# Patient Record
Sex: FEMALE | Race: ASIAN | Employment: FULL TIME | URBAN - METROPOLITAN AREA
[De-identification: names, ages, dates, MRNs, and addresses within clinical notes are randomized per-mention and may not be internally consistent; named-entity substitution may affect disease eponyms.]

---

## 2017-03-08 ENCOUNTER — OFFICE VISIT (OUTPATIENT)
Dept: INTERNAL MEDICINE | Facility: CLINIC | Age: 22
End: 2017-03-08
Payer: COMMERCIAL

## 2017-03-08 VITALS
OXYGEN SATURATION: 99 % | HEIGHT: 61 IN | WEIGHT: 128 LBS | TEMPERATURE: 98.6 F | DIASTOLIC BLOOD PRESSURE: 70 MMHG | HEART RATE: 76 BPM | SYSTOLIC BLOOD PRESSURE: 110 MMHG | BODY MASS INDEX: 24.17 KG/M2

## 2017-03-08 DIAGNOSIS — E55.9 VITAMIN D DEFICIENCY: ICD-10-CM

## 2017-03-08 DIAGNOSIS — Z11.3 SCREEN FOR STD (SEXUALLY TRANSMITTED DISEASE): Primary | ICD-10-CM

## 2017-03-08 DIAGNOSIS — Z13.6 CARDIOVASCULAR SCREENING; LDL GOAL LESS THAN 160: ICD-10-CM

## 2017-03-08 LAB
ALBUMIN SERPL-MCNC: 3.9 G/DL (ref 3.4–5)
ALP SERPL-CCNC: 69 U/L (ref 40–150)
ALT SERPL W P-5'-P-CCNC: 19 U/L (ref 0–50)
ANION GAP SERPL CALCULATED.3IONS-SCNC: 7 MMOL/L (ref 3–14)
AST SERPL W P-5'-P-CCNC: 14 U/L (ref 0–45)
BILIRUB SERPL-MCNC: 0.3 MG/DL (ref 0.2–1.3)
BUN SERPL-MCNC: 14 MG/DL (ref 7–30)
CALCIUM SERPL-MCNC: 8.8 MG/DL (ref 8.5–10.1)
CHLORIDE SERPL-SCNC: 106 MMOL/L (ref 94–109)
CHOLEST SERPL-MCNC: 134 MG/DL
CO2 SERPL-SCNC: 28 MMOL/L (ref 20–32)
CREAT SERPL-MCNC: 0.77 MG/DL (ref 0.52–1.04)
DEPRECATED CALCIDIOL+CALCIFEROL SERPL-MC: 8 UG/L (ref 20–75)
GFR SERPL CREATININE-BSD FRML MDRD: NORMAL ML/MIN/1.7M2
GLUCOSE SERPL-MCNC: 85 MG/DL (ref 70–99)
HDLC SERPL-MCNC: 55 MG/DL
HGB BLD-MCNC: 12.1 G/DL (ref 11.7–15.7)
LDLC SERPL CALC-MCNC: 71 MG/DL
NONHDLC SERPL-MCNC: 79 MG/DL
POTASSIUM SERPL-SCNC: 4.1 MMOL/L (ref 3.4–5.3)
PROT SERPL-MCNC: 7.3 G/DL (ref 6.8–8.8)
SODIUM SERPL-SCNC: 141 MMOL/L (ref 133–144)
TRIGL SERPL-MCNC: 42 MG/DL

## 2017-03-08 PROCEDURE — 82306 VITAMIN D 25 HYDROXY: CPT | Performed by: INTERNAL MEDICINE

## 2017-03-08 PROCEDURE — 80053 COMPREHEN METABOLIC PANEL: CPT | Performed by: INTERNAL MEDICINE

## 2017-03-08 PROCEDURE — 80061 LIPID PANEL: CPT | Performed by: INTERNAL MEDICINE

## 2017-03-08 PROCEDURE — 36415 COLL VENOUS BLD VENIPUNCTURE: CPT | Performed by: INTERNAL MEDICINE

## 2017-03-08 PROCEDURE — 87491 CHLMYD TRACH DNA AMP PROBE: CPT | Performed by: INTERNAL MEDICINE

## 2017-03-08 PROCEDURE — 99214 OFFICE O/P EST MOD 30 MIN: CPT | Performed by: INTERNAL MEDICINE

## 2017-03-08 PROCEDURE — 85018 HEMOGLOBIN: CPT | Performed by: INTERNAL MEDICINE

## 2017-03-08 NOTE — PROGRESS NOTES
SUBJECTIVE:                                                    Aracelis Tyson is a 22 year old female who presents to clinic today for the following health issues:    New patient to me and .  Patient previously seen in pediatrics, looking to transfer care to . No specific concerns today.     Pap- DUE, advised, prefers female provider    LMP early February. No sexual activity.    Problem list and histories reviewed & adjusted, as indicated.  Additional history: as documented    Patient Active Problem List   Diagnosis     Dental caries     Vitamin D deficiency     Anemia     History reviewed. No pertinent past surgical history.    Social History   Substance Use Topics     Smoking status: Never Smoker     Smokeless tobacco: Never Used     Alcohol use No     Family History   Problem Relation Age of Onset     Connective Tissue Disorder Brother      vocal cord paralysis         No current outpatient prescriptions on file.     BP Readings from Last 3 Encounters:   03/08/17 110/70   09/25/16 117/78   03/23/16 96/66    Wt Readings from Last 3 Encounters:   03/08/17 128 lb (58.1 kg)   09/25/16 130 lb (59 kg)   03/23/16 133 lb (60.3 kg)           Labs reviewed in EPIC    Reviewed and updated as needed this visit by clinical staff       Reviewed and updated as needed this visit by Provider       ROS:  C: NEGATIVE for fever, chills, change in weight  E/M: NEGATIVE for ear, mouth and throat problems  R: NEGATIVE for significant cough or SOB  CV: NEGATIVE for chest pain, palpitations or peripheral edema  GI: NEGATIVE for nausea, abdominal pain, heartburn, or change in bowel habits  : NEGATIVE for frequency, dysuria, or hematuria  M: NEGATIVE for significant arthralgias or myalgia  H: NEGATIVE for bleeding problems  P: NEGATIVE for changes in mood or affect    OBJECTIVE:                                                    /70 (BP Location: Left arm, Patient Position: Chair, Cuff Size: Adult Regular)  Pulse 76  Temp  "98.6  F (37  C) (Oral)  Ht 5' 0.5\" (1.537 m)  Wt 128 lb (58.1 kg)  LMP 02/10/2017 (Approximate)  SpO2 99%  Breastfeeding? No  BMI 24.59 kg/m2  Body mass index is 24.59 kg/(m^2).  GENERAL: healthy, alert and no distress  EYES: Eyes grossly normal to inspection, extraocular movements - intact, and PERRL  HENT: ear canals- normal; TMs- normal; Nose- normal; Mouth- no ulcers, no lesions  NECK: no tenderness, no adenopathy, no asymmetry, no masses, no stiffness; thyroid- normal to palpation  RESP: lungs clear to auscultation - no rales, no rhonchi, no wheezes  CV: regular rates and rhythm, normal S1 S2, no S3 or S4 and no murmur, no click or rub   MS: extremities- no gross deformities noted  PSYCH: Alert and oriented times 3; speech- coherent , normal rate and volume; able to articulate logical thoughts, able to abstract reason, no tangential thoughts, no hallucinations or delusions, affect- normal     ASSESSMENT/PLAN:                                                      (Z11.3) Screen for STD (sexually transmitted disease)  (primary encounter diagnosis)  Comment: screening as ordered  Plan: Chlamydia trachomatis PCR            (Z13.6) CARDIOVASCULAR SCREENING; LDL GOAL LESS THAN 160  Comment: labs as fasting done  Plan: Hemoglobin, Comprehensive metabolic panel,         Lipid Profile            (E55.9) Vitamin D deficiency  Comment: as prior screening  Plan: Vitamin D Deficiency            Advised that she is due for pap smear and she will schedule with female provider.    See Patient Instructions    Kaden Gaviria MD  [facility]    THE MEDICATION LIST HAS BEEN FULLY RECONCILED BY THE M.D. AND THE NURSING STAFF.      "

## 2017-03-08 NOTE — NURSING NOTE
"Chief Complaint   Patient presents with     Establish Care       Initial /70 (BP Location: Left arm, Patient Position: Chair, Cuff Size: Adult Regular)  Pulse 76  Temp 98.6  F (37  C) (Oral)  Ht 5' 0.5\" (1.537 m)  Wt 128 lb (58.1 kg)  LMP 02/10/2017 (Approximate)  SpO2 99%  Breastfeeding? No  BMI 24.59 kg/m2 Estimated body mass index is 24.59 kg/(m^2) as calculated from the following:    Height as of this encounter: 5' 0.5\" (1.537 m).    Weight as of this encounter: 128 lb (58.1 kg).  Medication Reconciliation: complete   Shannen Taylor CMA      "

## 2017-03-08 NOTE — MR AVS SNAPSHOT
"              After Visit Summary   3/8/2017    Aracelis Tyson    MRN: [id number]           Patient Information     Date Of Birth          1995        Visit Information        Provider Department      3/8/2017 7:40 AM Kaden Gaviria MD [facility]        Today's Diagnoses     Screen for STD (sexually transmitted disease)    -  1    CARDIOVASCULAR SCREENING; LDL GOAL LESS THAN 160        Vitamin D deficiency           Follow-ups after your visit        Who to contact     If you have questions or need follow up information about today's clinic visit or your schedule please contact [facility] directly at [phone].  Normal or non-critical lab and imaging results will be communicated to you by MyChart, letter or phone within 4 business days after the clinic has received the results. If you do not hear from us within 7 days, please contact the clinic through MyChart or phone. If you have a critical or abnormal lab result, we will notify you by phone as soon as possible.  Submit refill requests through [employer] or call your pharmacy and they will forward the refill request to us. Please allow 3 business days for your refill to be completed.          Additional Information About Your Visit        MyChart Information     [employer] lets you send messages to your doctor, view your test results, renew your prescriptions, schedule appointments and more. To sign up, go to [email or web address] . Click on \"Log in\" on the left side of the screen, which will take you to the Welcome page. Then click on \"Sign up Now\" on the right side of the page.     You will be asked to enter the access code listed below, as well as some personal information. Please follow the directions to create your username and password.     Your access code is: [email or web address]  Expires: 2017  7:55 AM     Your access code will  in 90 days. If you need help or a new code, please call your " "[facility] or [phone].        Care EveryWhere ID     This is your Care EveryWhere ID. This could be used by other organizations to access your [place] medical records  [id number]        Your Vitals Were     Pulse Temperature Height Last Period Pulse Oximetry Breastfeeding?    76 98.6  F (37  C) (Oral) 5' 0.5\" (1.537 m) 02/10/2017 (Approximate) 99% No    BMI (Body Mass Index)                   24.59 kg/m2            Blood Pressure from Last 3 Encounters:   03/08/17 110/70   09/25/16 117/78   03/23/16 96/66    Weight from Last 3 Encounters:   03/08/17 128 lb (58.1 kg)   09/25/16 130 lb (59 kg)   03/23/16 133 lb (60.3 kg)              We Performed the Following     Chlamydia trachomatis PCR     Comprehensive metabolic panel     Hemoglobin     Lipid Profile     Vitamin D Deficiency        Primary Care Provider Office Phone # Fax #    [place] Candace Vásquez [phone]       [facility] [address] [facility] [address]        Thank you!     Thank you for choosing [facility]  for your care. Our goal is always to provide you with excellent care. Hearing back from our patients is one way we can continue to improve our services. Please take a few minutes to complete the written survey that you may receive in the mail after your visit with us. Thank you!             Your Updated Medication List - Protect others around you: Learn how to safely use, store and throw away your medicines at www.disposemymeds.org.      Notice  As of 3/8/2017  7:55 AM    You have not been prescribed any medications.      "

## 2017-03-08 NOTE — LETTER
[address]  [address]      March 9, 2017      Aracelis Iglesias Nhasang  [address] [facility] [address]          Dear Aracelis,      I have enclosed a copy of your most recent labs done here at the clinic and if available some of your prior labs for comparison.     I am pleased to inform you that your routine blood work including your hemoglobin, sodium, potassium, calcium, kidney and liver function tests are all normal.    Your cholesterol looks good and I would not change anything at this point but would repeat your labs in 12 months.    Your routine STD cultures all returned normal.    Your vitamin D function tests are abnormal and low and should be treated more aggressively.  There are several options that I would like to discuss with you during a follow-up visit with me.  I will look forward to seeing you at that time and please call to make an appointment.      Please call me if you have further questions.        Kaden Gaviria MD

## 2017-03-09 LAB
C TRACH DNA SPEC QL NAA+PROBE: NORMAL
SPECIMEN SOURCE: NORMAL

## 2017-04-17 ENCOUNTER — OFFICE VISIT (OUTPATIENT)
Dept: OBGYN | Facility: CLINIC | Age: 22
End: 2017-04-17
Payer: COMMERCIAL

## 2017-04-17 VITALS
HEIGHT: 60 IN | RESPIRATION RATE: 16 BRPM | HEART RATE: 72 BPM | BODY MASS INDEX: 25.21 KG/M2 | DIASTOLIC BLOOD PRESSURE: 68 MMHG | WEIGHT: 128.4 LBS | SYSTOLIC BLOOD PRESSURE: 108 MMHG | TEMPERATURE: 97.6 F | OXYGEN SATURATION: 99 %

## 2017-04-17 DIAGNOSIS — Z00.01 ENCOUNTER FOR ROUTINE ADULT HEALTH EXAMINATION WITH ABNORMAL FINDINGS: Primary | ICD-10-CM

## 2017-04-17 DIAGNOSIS — E55.9 VITAMIN D DEFICIENCY: ICD-10-CM

## 2017-04-17 DIAGNOSIS — Z12.4 SCREENING FOR MALIGNANT NEOPLASM OF CERVIX: ICD-10-CM

## 2017-04-17 DIAGNOSIS — Z00.00 ROUTINE ADULT HEALTH MAINTENANCE: ICD-10-CM

## 2017-04-17 PROCEDURE — G0123 SCREEN CERV/VAG THIN LAYER: HCPCS | Performed by: OBSTETRICS & GYNECOLOGY

## 2017-04-17 PROCEDURE — 99385 PREV VISIT NEW AGE 18-39: CPT | Performed by: OBSTETRICS & GYNECOLOGY

## 2017-04-17 NOTE — NURSING NOTE
"Chief Complaint   Patient presents with     Physical       Initial /68  Pulse 72  Temp 97.6  F (36.4  C) (Oral)  Resp 16  Ht 5' 0.05\" (1.525 m)  Wt 128 lb 6.4 oz (58.2 kg)  LMP 04/04/2017  SpO2 99%  BMI 25.03 kg/m2 Estimated body mass index is 25.03 kg/(m^2) as calculated from the following:    Height as of this encounter: 5' 0.05\" (1.525 m).    Weight as of this encounter: 128 lb 6.4 oz (58.2 kg).  Medication Reconciliation: complete   Pt here for first Pap  States no problems  Chlamydia screen was done 3-8-17  Does not desire IOANA Sarkar LPN      "

## 2017-04-17 NOTE — LETTER
April 25, 2017      Aracelis Tyson  [address] [facility] [address]    Dear ,      I am happy to inform you that your recent cervical cancer screening test (PAP smear) was normal.      Preventative screenings such as this help to ensure your health for years to come. You should repeat a pap smear in 3 years, unless otherwise directed.      You will still need to return to the clinic every year for your annual exam and other preventive tests.     Please contact the clinic at [phone] if you have further questions.       Sincerely,      Gisela Martinez MD/kay

## 2017-04-17 NOTE — PATIENT INSTRUCTIONS
Preventive Health Recommendations  Female Ages 18 to 25      Yearly exam:      See your health care provider every year in order to    Review health changes.      Discuss preventive care.       Review your medicines if your doctor has prescribed any.        You should be tested each year for STDs (sexually transmitted diseases).        Starting at age 21, get a Pap test every three years. If you have an abnormal result, your doctor may have you test more often.        If you are at risk for diabetes, you should have a diabetes test (fasting glucose).    Shots:      Get a flu shot each year.      Get a tetanus shot every 10 years.      Consider getting the shot (vaccine) that prevents cervical cancer (Gardasil).    Nutrition:      Eat at least 5 servings of fruits and vegetables each day.    Eat whole-grain bread, whole-wheat pasta and brown rice instead of white grains and rice.    For bone health:  Eat calcium-rich foods or take calcium pills (500 to 600 mg) twice a day with food. Also take vitamin D (1000 IUs) each day.    Lifestyle    Exercise at least 150 minutes a week each week (30 minutes a day, 5 days a week). This will help you control your weight and prevent disease.    Limit alcohol to one drink per day.    No smoking.      Wear sunscreen to prevent skin cancer.    See your dentist every six months for an exam and cleaning.

## 2017-04-17 NOTE — PROGRESS NOTES
SUBJECTIVE:                                                   Aracelis Tyson is a 22 year old female who presents to clinic today for preventive health exam.   Periods are regular q 28-30 days, lasting 3 days. Dysmenorrhea:mild, occurring first 1-2 days of flow. Cyclic symptoms include acne. No intermenstrual bleeding,   spotting, or discharge. Has not been sexually active. Patient's last menstrual period was 04/04/2017.   - Takes a weight training class twice weekly and walks/jogs on her own    Current contraception: abstinence  History of abnormal Pap smear: No    ROS:  Const: no weight changes, fever, chills  : no dysuria, hematuria, urinary frequency, urgency, hesitancy  GI: no constipation, diarrhea, abdominal pain  Breast: no nipple discharge, skin changes, lumps  Heme: no history blood clots or easy bruising/bleeding  Neuro: no Hx migraine, no aura  Psych: mood stable, no anxiety, depression  CV: no chest pain, palpitations  Pulm: no shortness of breath, chest tightness, cough, wheeze  Skin: no rashes, skin changes     No results found for: PAP     Family history of breast CA: No  Family history of uterine/ovarian CA: No  Family history of colon CA: No    Patient Active Problem List   Diagnosis     Dental caries     Vitamin D deficiency     Anemia     History reviewed. No pertinent surgical history.   Social History   Substance Use Topics     Smoking status: Never Smoker     Smokeless tobacco: Never Used     Alcohol use No      Problem (# of Occurrences) Relation (Name,Age of Onset)    Connective Tissue Disorder (1) Brother: vocal cord paralysis              No current outpatient prescriptions on file prior to visit.  No current facility-administered medications on file prior to visit.   Allergies   Allergen Reactions     Nkda [No Known Drug Allergies]        Problem list and histories reviewed and adjusted as indicated.     OBJECTIVE:     /68  Pulse 72  Temp 97.6  F (36.4  C) (Oral)  Resp 16  " Ht 5' 0.05\" (1.525 m)  Wt 128 lb 6.4 oz (58.2 kg)  LMP 2017  SpO2 99%  BMI 25.03 kg/m2    Gen: Healthy appearing female, no acute distress, comfortable  HENT: No scleral injection or icterus, normal thyroid palpably normal without nodules or enlargement  CV: Regular rate and rhythm, well perfused, no murmur  Resp: CTAB, normal work of breathing, no cough or wheeze  Breast Exam: No visible masses or suspicious skin changes.  No discrete or dominant masses to palpation.  No axillary lymphadenopathy.  GI: Abdomen soft, non-tender. No masses, organomegaly  Skin: No suspicious lesions or rashes  Psychiatric: mentation appears normal and affect bright    : External genitalia normal well-estrogenized, healthy tissue.  No obvious excoriations, lesions, or rashes. Bartholins, urethra, skeins normal.  Normal pink vaginal mucosa.  SSE: Normal cervix, normal physiologic discharge.   Bimanual: No CMT, small mobile uterus. No adnexal masses or tenderness appreciated.     ASSESSMENT/PLAN:                                                    Aracelis Tyson is a 22 year old female  with satisfactory annual exam and hypovitaminosis D.    PLAN:  Dx:  1)  Pap smear collected today  2)  Not sexually active, no contraception currently  3)  Routine adult health maintenance  - calcium carb 1250 mg, 500 mg [place],/vitamin D 200 units (OSCAL WITH D) 500-200 MG-UNIT per tablet; Take 1 tablet by mouth 2 times daily (with meals)  Dispense: 90 tablet; Refill: 11  4) Vitamin D deficiency  - cholecalciferol 5000 UNITS CAPS; Take 1 capsule (5,000 Units) by mouth daily    PE:  Reviewed health maintenance including diet, regular exercise   and periodic exams.    Return to clinic in 1 year.    COUNSELING:   Reviewed preventive health counseling, as reflected in patient instructions   reports that she has never smoked. She has never used smokeless tobacco.        Gisela Martinez MD   Obstetrics and Gynecology      "

## 2017-04-17 NOTE — MR AVS SNAPSHOT
After Visit Summary   4/17/2017    Aracelis Tyson    MRN: [id number]           Patient Information     Date Of Birth          1995        Visit Information        Provider Department      4/17/2017 9:30 AM Gisela Martinez MD [facility]        Today's Diagnoses     Encounter for routine adult health examination with abnormal findings    -  1    Routine adult health maintenance        Vitamin D deficiency          Care Instructions    Preventive Health Recommendations  Female Ages 18 to 25      Yearly exam:      See your health care provider every year in order to    Review health changes.      Discuss preventive care.       Review your medicines if your doctor has prescribed any.        You should be tested each year for STDs (sexually transmitted diseases).        Starting at age 21, get a Pap test every three years. If you have an abnormal result, your doctor may have you test more often.        If you are at risk for diabetes, you should have a diabetes test (fasting glucose).    Shots:      Get a flu shot each year.      Get a tetanus shot every 10 years.      Consider getting the shot (vaccine) that prevents cervical cancer (Gardasil).    Nutrition:      Eat at least 5 servings of fruits and vegetables each day.    Eat whole-grain bread, whole-wheat pasta and brown rice instead of white grains and rice.    For bone health:  Eat calcium-rich foods or take calcium pills (500 to 600 mg) twice a day with food. Also take vitamin D (1000 IUs) each day.    Lifestyle    Exercise at least 150 minutes a week each week (30 minutes a day, 5 days a week). This will help you control your weight and prevent disease.    Limit alcohol to one drink per day.    No smoking.      Wear sunscreen to prevent skin cancer.    See your dentist every six months for an exam and cleaning.           Follow-ups after your visit        Who to contact     If you have questions or need follow up  "information about today's clinic visit or your schedule please contact [facility] directly at [phone].  Normal or non-critical lab and imaging results will be communicated to you by MyChart, letter or phone within 4 business days after the clinic has received the results. If you do not hear from us within 7 days, please contact the clinic through [employer] or phone. If you have a critical or abnormal lab result, we will notify you by phone as soon as possible.  Submit refill requests through [employer] or call your pharmacy and they will forward the refill request to us. Please allow 3 business days for your refill to be completed.          Additional Information About Your Visit        [employer] Information     [employer] lets you send messages to your doctor, view your test results, renew your prescriptions, schedule appointments and more. To sign up, go to [email or web address] . Click on \"Log in\" on the left side of the screen, which will take you to the Welcome page. Then click on \"Sign up Now\" on the right side of the page.     You will be asked to enter the access code listed below, as well as some personal information. Please follow the directions to create your username and password.     Your access code is: [email or web address]  Expires: 2017  8:55 AM     Your access code will  in 90 days. If you need help or a new code, please call your [place] clinic or [phone].        Care EveryWhere ID     This is your Care EveryWhere ID. This could be used by other organizations to access your [place] medical records  [id number]        Your Vitals Were     Pulse Temperature Respirations Height Last Period Pulse Oximetry    72 97.6  F (36.4  C) (Oral) 16 5' 0.05\" (1.525 m) 2017 99%    BMI (Body Mass Index)                   25.03 kg/m2            Blood Pressure from Last 3 Encounters:   17 108/68   17 110/70   16 117/78    Weight from Last 3 Encounters: "   04/17/17 128 lb 6.4 oz (58.2 kg)   03/08/17 128 lb (58.1 kg)   09/25/16 130 lb (59 kg)              Today, you had the following     No orders found for display         Today's Medication Changes          These changes are accurate as of: 4/17/17 10:00 AM.  If you have any questions, ask your nurse or doctor.               Start taking these medicines.        Dose/Directions    calcium carb 1250 mg (500 mg Alutiiq)/vitamin D 200 units 500-200 MG-UNIT per tablet   Commonly known as:  OSCAL with D   Used for:  Routine adult health maintenance   Started by:  Gisela Martinez MD        Dose:  1 tablet   Take 1 tablet by mouth 2 times daily (with meals)   Quantity:  90 tablet   Refills:  11       cholecalciferol 5000 UNITS Caps   Used for:  Vitamin D deficiency   Started by:  Gisela Martinez MD        Dose:  5000 Units   Take 1 capsule (5,000 Units) by mouth daily   Quantity:  30 capsule   Refills:  3            Where to get your medicines      These medications were sent to [address] [address]     Phone:  [phone]     cholecalciferol 5000 UNITS Caps         Some of these will need a paper prescription and others can be bought over the counter.  Ask your nurse if you have questions.     Bring a paper prescription for each of these medications     calcium carb 1250 mg (500 mg Alutiiq)/vitamin D 200 units 500-200 MG-UNIT per tablet                Primary Care Provider Office Phone # Fax #    Kaden Gaviria [phone]       [address] [address]        Thank you!     Thank you for choosing [facility]  for your care. Our goal is always to provide you with excellent care. Hearing back from our patients is one way we can continue to improve our services. Please take a few minutes to complete the written survey that you may receive in the mail after your visit  with us. Thank you!             Your Updated Medication List - Protect others around you: Learn how to safely use, store and throw away your medicines at www.disposemymeds.org.          This list is accurate as of: 4/17/17 10:00 AM.  Always use your most recent med list.                   Brand Name Dispense Instructions for use    calcium carb 1250 mg (500 mg [place])/vitamin D 200 units 500-200 MG-UNIT per tablet    OSCAL with D    90 tablet    Take 1 tablet by mouth 2 times daily (with meals)       cholecalciferol 5000 UNITS Caps     30 capsule    Take 1 capsule (5,000 Units) by mouth daily

## 2017-04-21 LAB
COPATH REPORT: NORMAL
PAP: NORMAL

## 2017-08-04 ENCOUNTER — OFFICE VISIT (OUTPATIENT)
Dept: URGENT CARE | Facility: URGENT CARE | Age: 22
End: 2017-08-04
Payer: COMMERCIAL

## 2017-08-04 VITALS
SYSTOLIC BLOOD PRESSURE: 110 MMHG | OXYGEN SATURATION: 98 % | DIASTOLIC BLOOD PRESSURE: 62 MMHG | TEMPERATURE: 98.1 F | WEIGHT: 128.2 LBS | HEART RATE: 75 BPM | BODY MASS INDEX: 25 KG/M2

## 2017-08-04 DIAGNOSIS — R21 RASH AND NONSPECIFIC SKIN ERUPTION: ICD-10-CM
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
## 2017-08-04 PROCEDURE — 99213 OFFICE O/P EST LOW 20 MIN: CPT | Performed by: PHYSICIAN ASSISTANT

## 2017-08-04 RX ORDER — TRIAMCINOLONE ACETONIDE 5 MG/G
CREAM TOPICAL
Qty: 30 G | Refills: 0 | Status: SHIPPED | OUTPATIENT
Start: 2017-08-04 | End: 2019-10-28

## 2017-08-04 RX ORDER — CETIRIZINE HYDROCHLORIDE 10 MG/1
10 TABLET ORAL EVERY EVENING
Qty: 30 TABLET | Refills: 1 | Status: SHIPPED | OUTPATIENT
Start: 2017-08-04 | End: 2019-10-28

## 2017-08-04 RX ORDER — PREDNISONE 20 MG/1
20 TABLET ORAL 2 TIMES DAILY
Qty: 10 TABLET | Refills: 0 | Status: SHIPPED | OUTPATIENT
Start: 2017-08-04 | End: 2019-10-28

## 2017-08-04 NOTE — PROGRESS NOTES
SUBJECTIVE:  Aracelis Tyson is a 22 year old female who presents to the clinic today for a rash.  Onset of rash was 3 day(s) ago.   Rash is still present.  Location of the rash: back, chest and legs.  Quality/symptoms of rash: itching and bumps   Symptoms are mild and moderate and rash seems to be stable.  Previous history of a similar rash? No  Recent exposure history: bites  Recent new medications: none  Associated symptoms include: itching.    Past Medical History:   Diagnosis Date     Acne 8/19/2012        Allergies   Allergen Reactions     Nkda [No Known Drug Allergies]      Social History   Substance Use Topics     Smoking status: Never Smoker     Smokeless tobacco: Never Used     Alcohol use No       ROS:  CONSTITUTIONAL:NEGATIVE for fever, chills, change in weight  INTEGUMENTARY/SKIN: POSITIVE for rash on arms, legs and back  ENT/MOUTH: NEGATIVE for ear, mouth and throat problems  RESP:NEGATIVE for significant cough or SOB  CV: NEGATIVE for chest pain, palpitations or peripheral edema  MUSCULOSKELETAL: NEGATIVE for significant arthralgias or myalgia  NEURO: NEGATIVE for weakness, dizziness or paresthesias    EXAM:   /62  Pulse 75  Temp 98.1  F (36.7  C) (Oral)  Wt 128 lb 3.2 oz (58.2 kg)  SpO2 98%  BMI 25 kg/m2  GENERAL: alert, no acute distress.  SKIN: Rash description:    Distribution: generalized  Location: generalized    Color: red,  Lesion type: wheals, blotchy with itching  GENERAL APPEARANCE: healthy, alert and no distress  EYES: EOMI,  PERRL, conjunctiva clear  NECK: supple, non-tender to palpation, no adenopathy noted  RESP: lungs clear to auscultation - no rales, rhonchi or wheezes  CV: regular rates and rhythm, normal S1 S2, no murmur noted  NEURO: Normal strength and tone, sensory exam grossly normal,  normal speech and mentation    ASSESSMENT/PLAN:      ICD-10-CM    1. Bug bite without infection, initial encounter W57.XXXA triamcinolone (KENALOG) 0.5 % cream     cetirizine  (ZYRTEC) 10 MG tablet     predniSONE (DELTASONE) 20 MG tablet   2. Rash and nonspecific skin eruption R21 triamcinolone (KENALOG) 0.5 % cream     cetirizine (ZYRTEC) 10 MG tablet     predniSONE (DELTASONE) 20 MG tablet     PLAN:  Orders Placed This Encounter     triamcinolone (KENALOG) 0.5 % cream     cetirizine (ZYRTEC) 10 MG tablet     predniSONE (DELTASONE) 20 MG tablet       1) See today's orders.  2) Follow-up with primary clinic if not improving

## 2017-08-04 NOTE — NURSING NOTE
"Chief Complaint   Patient presents with     Insect Bites     pt unsure if it's bug bites or rash on her chest, back and legs x 5 days very itchy       Initial /62  Pulse 75  Temp 98.1  F (36.7  C) (Oral)  Wt 128 lb 3.2 oz (58.2 kg)  SpO2 98%  BMI 25 kg/m2 Estimated body mass index is 25 kg/(m^2) as calculated from the following:    Height as of 4/17/17: 5' 0.05\" (1.525 m).    Weight as of this encounter: 128 lb 3.2 oz (58.2 kg).  Medication Reconciliation: complete    "

## 2019-10-29 ENCOUNTER — OFFICE VISIT (OUTPATIENT)
Dept: INTERNAL MEDICINE | Facility: CLINIC | Age: 24
End: 2019-10-29
Payer: COMMERCIAL

## 2019-10-29 VITALS
SYSTOLIC BLOOD PRESSURE: 104 MMHG | TEMPERATURE: 97.9 F | WEIGHT: 121.9 LBS | BODY MASS INDEX: 23.93 KG/M2 | OXYGEN SATURATION: 100 % | HEART RATE: 66 BPM | HEIGHT: 60 IN | RESPIRATION RATE: 14 BRPM | DIASTOLIC BLOOD PRESSURE: 64 MMHG

## 2019-10-29 DIAGNOSIS — Z13.6 CARDIOVASCULAR SCREENING; LDL GOAL LESS THAN 160: ICD-10-CM

## 2019-10-29 DIAGNOSIS — E55.9 VITAMIN D DEFICIENCY: ICD-10-CM

## 2019-10-29 DIAGNOSIS — Z11.3 SCREEN FOR STD (SEXUALLY TRANSMITTED DISEASE): ICD-10-CM

## 2019-10-29 DIAGNOSIS — Z00.00 ROUTINE GENERAL MEDICAL EXAMINATION AT A HEALTH CARE FACILITY: Primary | ICD-10-CM

## 2019-10-29 LAB
ALBUMIN SERPL-MCNC: 3.9 G/DL (ref 3.4–5)
ALP SERPL-CCNC: 63 U/L (ref 40–150)
ALT SERPL W P-5'-P-CCNC: 17 U/L (ref 0–50)
ANION GAP SERPL CALCULATED.3IONS-SCNC: 7 MMOL/L (ref 3–14)
AST SERPL W P-5'-P-CCNC: 12 U/L (ref 0–45)
BILIRUB SERPL-MCNC: 0.2 MG/DL (ref 0.2–1.3)
BUN SERPL-MCNC: 15 MG/DL (ref 7–30)
CALCIUM SERPL-MCNC: 8.6 MG/DL (ref 8.5–10.1)
CHLORIDE SERPL-SCNC: 107 MMOL/L (ref 94–109)
CHOLEST SERPL-MCNC: 155 MG/DL
CO2 SERPL-SCNC: 26 MMOL/L (ref 20–32)
CREAT SERPL-MCNC: 0.77 MG/DL (ref 0.52–1.04)
GFR SERPL CREATININE-BSD FRML MDRD: >90 ML/MIN/{1.73_M2}
GLUCOSE SERPL-MCNC: 81 MG/DL (ref 70–99)
HDLC SERPL-MCNC: 59 MG/DL
HGB BLD-MCNC: 12.2 G/DL (ref 11.7–15.7)
LDLC SERPL CALC-MCNC: 89 MG/DL
NONHDLC SERPL-MCNC: 96 MG/DL
POTASSIUM SERPL-SCNC: 3.7 MMOL/L (ref 3.4–5.3)
PROT SERPL-MCNC: 7.7 G/DL (ref 6.8–8.8)
SODIUM SERPL-SCNC: 140 MMOL/L (ref 133–144)
TRIGL SERPL-MCNC: 33 MG/DL

## 2019-10-29 PROCEDURE — 36415 COLL VENOUS BLD VENIPUNCTURE: CPT | Performed by: INTERNAL MEDICINE

## 2019-10-29 PROCEDURE — 80061 LIPID PANEL: CPT | Performed by: INTERNAL MEDICINE

## 2019-10-29 PROCEDURE — 87491 CHLMYD TRACH DNA AMP PROBE: CPT | Performed by: INTERNAL MEDICINE

## 2019-10-29 PROCEDURE — 85018 HEMOGLOBIN: CPT | Performed by: INTERNAL MEDICINE

## 2019-10-29 PROCEDURE — 99395 PREV VISIT EST AGE 18-39: CPT | Mod: 25 | Performed by: INTERNAL MEDICINE

## 2019-10-29 PROCEDURE — 90471 IMMUNIZATION ADMIN: CPT | Performed by: INTERNAL MEDICINE

## 2019-10-29 PROCEDURE — 87389 HIV-1 AG W/HIV-1&-2 AB AG IA: CPT | Performed by: INTERNAL MEDICINE

## 2019-10-29 PROCEDURE — 82306 VITAMIN D 25 HYDROXY: CPT | Performed by: INTERNAL MEDICINE

## 2019-10-29 PROCEDURE — 80053 COMPREHEN METABOLIC PANEL: CPT | Performed by: INTERNAL MEDICINE

## 2019-10-29 PROCEDURE — 90686 IIV4 VACC NO PRSV 0.5 ML IM: CPT | Performed by: INTERNAL MEDICINE

## 2019-10-29 ASSESSMENT — MIFFLIN-ST. JEOR: SCORE: 1230.22

## 2019-10-29 NOTE — PROGRESS NOTES
SUBJECTIVE:   CC: Aracelis Tyson is an 24 year old woman who presents for preventive health visit.     Answers for HPI/ROS submitted by the patient on 10/29/2019   Annual Exam:  Frequency of exercise:: 2-3 days/week  Getting at least 3 servings of Calcium per day:: Yes  Diet:: Regular (no restrictions)  Taking medications regularly:: Yes  Medication side effects:: None  Bi-annual eye exam:: Yes  Dental care twice a year:: NO  Sleep apnea or symptoms of sleep apnea:: None  Additional concerns today:: No  Duration of exercise:: 15-30 minutes    Today's PHQ-2 Score:   PHQ-2 ( 1999 Pfizer) 10/29/2019 10/29/2019   Q1: Little interest or pleasure in doing things 0 0   Q2: Feeling down, depressed or hopeless 0 0   PHQ-2 Score 0 0   Q1: Little interest or pleasure in doing things Not at all -   Q2: Feeling down, depressed or hopeless Not at all -   PHQ-2 Score 0 -     Abuse: Current or Past(Physical, Sexual or Emotional)- No  Do you feel safe in your environment? Yes    Social History     Tobacco Use     Smoking status: Never Smoker     Smokeless tobacco: Never Used   Substance Use Topics     Alcohol use: No     If you drink alcohol do you typically have >3 drinks per day or >7 drinks per week? No                     Reviewed orders with patient.  Reviewed health maintenance and updated orders accordingly - Yes      Mammogram not indicated    Pertinent mammograms are reviewed under the imaging tab.  History of abnormal Pap smear: NO - age 21-29 PAP every 3 years recommended  PAP / HPV 4/17/2017   PAP NIL     Reviewed and updated as needed this visit by clinical staff         Reviewed and updated as needed this visit by Provider         ROS:  CONSTITUTIONAL: NEGATIVE for fever, chills, change in weight  EYES: NEGATIVE for vision changes or irritation  ENT: NEGATIVE for ear, mouth and throat problems  RESP: NEGATIVE for significant SOB with occasional postnasal drainage and cough.  CV: NEGATIVE for chest pain,  "palpitations or peripheral edema  GI: NEGATIVE for nausea, abdominal pain, heartburn, or change in bowel habits  : NEGATIVE for unusual urinary or vaginal symptoms. Periods are regular.  MUSCULOSKELETAL: NEGATIVE for significant arthralgias or myalgia  NEURO: NEGATIVE for weakness, dizziness or paresthesias  PSYCHIATRIC: NEGATIVE for changes in mood or affect    OBJECTIVE:   /64   Pulse 66   Temp 97.9  F (36.6  C) (Oral)   Resp 14   Ht 1.525 m (5' 0.05\")   Wt 55.3 kg (121 lb 14.4 oz)   LMP 10/15/2019 (Approximate)   SpO2 100%   BMI 23.77 kg/m    EXAM:  GENERAL: healthy, alert and no distress  EYES: Eyes grossly normal to inspection, PERRL and conjunctivae and sclerae normal  HENT: ear canals and TM's normal, nose and mouth without ulcers or lesions  NECK: no adenopathy, no asymmetry, masses, or scars and thyroid normal to palpation  RESP: lungs clear to auscultation - no rales, rhonchi or wheezes  CV: regular rate and rhythm, normal S1 S2, no S3 or S4, no murmur, click or rub, no peripheral edema and peripheral pulses strong  ABDOMEN: soft, nontender, no hepatosplenomegaly, no masses and bowel sounds normal  MS: no gross musculoskeletal defects noted, no edema  NEURO: No focal changes  PSYCH: mentation appears normal, affect normal/bright    ASSESSMENT/PLAN:   1. Routine general medical examination at a health care facility  Recommended annual flu shot.  She was advised that she is due for a Pap smear in April of this upcoming year.  -      ADMIN VACCINE, FIRST [03644]  - INFLUENZA VACCINE IM > 6 MONTHS VALENT IIV4 [43420]  - Hemoglobin  - Comprehensive metabolic panel  - Lipid Profile    Discussed with patient trial of Flonase for some mild postnasal drainage and irritant cough.    2. Screen for STD (sexually transmitted disease)  Routine screening offered and will obtain today  - HIV Screening  - Chlamydia trachomatis PCR    3. CARDIOVASCULAR SCREENING; LDL GOAL LESS THAN 160  Labs ordered as " "fasting and screening  - Lipid Profile    4. Vitamin D deficiency  Recheck vitamin D due to prior deficiency  - Vitamin D Deficiency    COUNSELING:   Reviewed preventive health counseling, as reflected in patient instructions       Regular exercise       Healthy diet/nutrition    Estimated body mass index is 25 kg/m  as calculated from the following:    Height as of 4/17/17: 1.525 m (5' 0.05\").    Weight as of 8/4/17: 58.2 kg (128 lb 3.2 oz).       reports that she has never smoked. She has never used smokeless tobacco.    Counseling Resources:  ATP IV Guidelines  Pooled Cohorts Equation Calculator  Breast Cancer Risk Calculator  FRAX Risk Assessment  ICSI Preventive Guidelines  Dietary Guidelines for Americans, 2010  USDA's MyPlate  ASA Prophylaxis  Lung CA Screening    Kaden Gaviria MD  [facility]  "

## 2019-10-29 NOTE — LETTER
[facility]  [address] [address]  [phone]      10/30/2019       Aracelis Iglesias Nhasang  [address] [facility] [address]        Dear Aracelis,    I am pleased to inform you that your routine blood work including your hemoglobin, sodium, potassium, calcium, kidney and liver function tests are all normal.    Your cholesterol looks good and I would not change anything at this point but would repeat your labs in 6 months.    Your routine STD culture and HIV screen all returned normal.    Your vitamin D level returned low and you may want to consider replacing it more aggressively with some vitamin D supplementation.  I would suggest you follow-up here at the clinic so we can discuss what would be the best option of treatment for you.    Sincerely,      Kaden Gaviria MD  Internal Medicine

## 2019-10-30 LAB
C TRACH DNA SPEC QL NAA+PROBE: NEGATIVE
DEPRECATED CALCIDIOL+CALCIFEROL SERPL-MC: 9 UG/L (ref 20–75)
HIV 1+2 AB+HIV1 P24 AG SERPL QL IA: NONREACTIVE
SPECIMEN SOURCE: NORMAL

## 2020-12-06 ENCOUNTER — HEALTH MAINTENANCE LETTER (OUTPATIENT)
Age: 25
End: 2020-12-06

## 2021-03-11 NOTE — MR AVS SNAPSHOT
"              After Visit Summary   2017    Aracelis Tyson    MRN: [id number]           Patient Information     Date Of Birth          1995        Visit Information        Provider Department      2017 3:25 PM Albin Red PA-C [facility]        Today's Diagnoses     Bug bite without infection, initial encounter    -  1    Rash and nonspecific skin eruption           Follow-ups after your visit        Who to contact     If you have questions or need follow up information about today's clinic visit or your schedule please contact [facility] directly at [phone].  Normal or non-critical lab and imaging results will be communicated to you by SharePlowhart, letter or phone within 4 business days after the clinic has received the results. If you do not hear from us within 7 days, please contact the clinic through SharePlowhart or phone. If you have a critical or abnormal lab result, we will notify you by phone as soon as possible.  Submit refill requests through Helios or call your pharmacy and they will forward the refill request to us. Please allow 3 business days for your refill to be completed.          Additional Information About Your Visit        MyChart Information     Helios lets you send messages to your doctor, view your test results, renew your prescriptions, schedule appointments and more. To sign up, go to [email or web address] . Click on \"Log in\" on the left side of the screen, which will take you to the Welcome page. Then click on \"Sign up Now\" on the right side of the page.     You will be asked to enter the access code listed below, as well as some personal information. Please follow the directions to create your username and password.     Your access code is: [email or web address]  Expires: 2017  9:13 AM     Your access code will  in 90 days. If you need help or a new code, please call your [facility] clinic or [phone].   " Patient was called.   she stated she will call back.        Care EveryWhere ID     This is your Care EveryWhere ID. This could be used by other organizations to access your [facility] medical records  [id number]        Your Vitals Were     Pulse Temperature Pulse Oximetry BMI (Body Mass Index)          75 98.1  F (36.7  C) (Oral) 98% 25 kg/m2         Blood Pressure from Last 3 Encounters:   08/04/17 110/62   04/17/17 108/68   03/08/17 110/70    Weight from Last 3 Encounters:   08/04/17 128 lb 3.2 oz (58.2 kg)   04/17/17 128 lb 6.4 oz (58.2 kg)   03/08/17 128 lb (58.1 kg)              Today, you had the following     No orders found for display         Today's Medication Changes          These changes are accurate as of: 8/4/17 11:59 PM.  If you have any questions, ask your nurse or doctor.               Start taking these medicines.        Dose/Directions    cetirizine 10 MG tablet   Commonly known as:  zyrTEC   Used for:  Bug bite without infection, initial encounter, Rash and nonspecific skin eruption   Started by:  Albin Red PA-C        Dose:  10 mg   Take 1 tablet (10 mg) by mouth every evening   Quantity:  30 tablet   Refills:  1       predniSONE 20 MG tablet   Commonly known as:  DELTASONE   Used for:  Rash and nonspecific skin eruption, Bug bite without infection, initial encounter   Started by:  Albin Red PA-C        Dose:  20 mg   Take 1 tablet (20 mg) by mouth 2 times daily   Quantity:  10 tablet   Refills:  0       triamcinolone 0.5 % cream   Commonly known as:  KENALOG   Used for:  Bug bite without infection, initial encounter, Rash and nonspecific skin eruption   Started by:  Albin Red PA-C        Apply sparingly to affected area three times daily.   Quantity:  30 g   Refills:  0            Where to get your medicines      These medications were sent to [facility] [address] [address]     Phone:  [phone]     cetirizine 10 MG tablet    predniSONE 20 MG tablet     triamcinolone 0.5 % cream                Primary Care Provider Office Phone # Fax #    Kaden Gaviria [phone]       [facility] [address] [facility] [address]        Equal Access to Services     OSCARRODRIGUEZ MERVIN : Hadii aad ku hadchristiano Soomaali, waaxda luqadaha, qaybta kaalmada adeegyada, waxay idiin tariqn adeera allen lapetercecil silverio. So [facility] [phone].    ATENCIÓN: Si habla español, tiene a beckett disposición servicios gratuitos de asistencia lingüística. Llame al [phone].    We comply with applicable federal civil rights laws and [place] laws. We do not discriminate on the basis of race, color, national origin, age, disability sex, sexual orientation or gender identity.            Thank you!     Thank you for choosing [facility]  for your care. Our goal is always to provide you with excellent care. Hearing back from our patients is one way we can continue to improve our services. Please take a few minutes to complete the written survey that you may receive in the mail after your visit with us. Thank you!             Your Updated Medication List - Protect others around you: Learn how to safely use, store and throw away your medicines at www.disposemymeds.org.          This list is accurate as of: 8/4/17 11:59 PM.  Always use your most recent med list.                   Brand Name Dispense Instructions for use Diagnosis    calcium carb 1250 mg (500 mg [place])/vitamin D 200 units 500-200 MG-UNIT per tablet    OSCAL with D    90 tablet    Take 1 tablet by mouth 2 times daily (with meals)    Routine adult health maintenance       cetirizine 10 MG tablet    zyrTEC    30 tablet    Take 1 tablet (10 mg) by mouth every evening    Bug bite without infection, initial encounter, Rash and nonspecific skin eruption       cholecalciferol 5000 UNITS Caps     30 capsule    Take 1 capsule (5,000 Units) by mouth daily    Vitamin D deficiency       predniSONE 20 MG  tablet    DELTASONE    10 tablet    Take 1 tablet (20 mg) by mouth 2 times daily    Rash and nonspecific skin eruption, Bug bite without infection, initial encounter       triamcinolone 0.5 % cream    KENALOG    30 g    Apply sparingly to affected area three times daily.    Bug bite without infection, initial encounter, Rash and nonspecific skin eruption

## 2021-03-15 ENCOUNTER — OFFICE VISIT (OUTPATIENT)
Dept: INTERNAL MEDICINE | Facility: CLINIC | Age: 26
End: 2021-03-15
Payer: COMMERCIAL

## 2021-03-15 VITALS
HEART RATE: 71 BPM | DIASTOLIC BLOOD PRESSURE: 62 MMHG | OXYGEN SATURATION: 100 % | RESPIRATION RATE: 14 BRPM | TEMPERATURE: 97.5 F | BODY MASS INDEX: 18.59 KG/M2 | HEIGHT: 66 IN | SYSTOLIC BLOOD PRESSURE: 108 MMHG | WEIGHT: 115.7 LBS

## 2021-03-15 DIAGNOSIS — Z11.3 SCREEN FOR STD (SEXUALLY TRANSMITTED DISEASE): ICD-10-CM

## 2021-03-15 DIAGNOSIS — Z11.59 ENCOUNTER FOR HCV SCREENING TEST FOR LOW RISK PATIENT: Primary | ICD-10-CM

## 2021-03-15 DIAGNOSIS — R19.8 ABDOMINAL FULLNESS: ICD-10-CM

## 2021-03-15 DIAGNOSIS — Z13.6 CARDIOVASCULAR SCREENING; LDL GOAL LESS THAN 160: ICD-10-CM

## 2021-03-15 LAB
ALBUMIN SERPL-MCNC: 4 G/DL (ref 3.4–5)
ALP SERPL-CCNC: 62 U/L (ref 40–150)
ALT SERPL W P-5'-P-CCNC: 13 U/L (ref 0–50)
ANION GAP SERPL CALCULATED.3IONS-SCNC: 3 MMOL/L (ref 3–14)
AST SERPL W P-5'-P-CCNC: 14 U/L (ref 0–45)
BILIRUB SERPL-MCNC: 0.4 MG/DL (ref 0.2–1.3)
BUN SERPL-MCNC: 10 MG/DL (ref 7–30)
CALCIUM SERPL-MCNC: 8.9 MG/DL (ref 8.5–10.1)
CHLORIDE SERPL-SCNC: 107 MMOL/L (ref 94–109)
CHOLEST SERPL-MCNC: 139 MG/DL
CO2 SERPL-SCNC: 28 MMOL/L (ref 20–32)
CREAT SERPL-MCNC: 0.69 MG/DL (ref 0.52–1.04)
GFR SERPL CREATININE-BSD FRML MDRD: >90 ML/MIN/{1.73_M2}
GLUCOSE SERPL-MCNC: 86 MG/DL (ref 70–99)
HCV AB SERPL QL IA: NONREACTIVE
HDLC SERPL-MCNC: 58 MG/DL
HGB BLD-MCNC: 12.7 G/DL (ref 11.7–15.7)
HIV 1+2 AB+HIV1 P24 AG SERPL QL IA: NONREACTIVE
LDLC SERPL CALC-MCNC: 71 MG/DL
NONHDLC SERPL-MCNC: 81 MG/DL
POTASSIUM SERPL-SCNC: 3.8 MMOL/L (ref 3.4–5.3)
PROT SERPL-MCNC: 8.2 G/DL (ref 6.8–8.8)
SODIUM SERPL-SCNC: 138 MMOL/L (ref 133–144)
TRIGL SERPL-MCNC: 50 MG/DL

## 2021-03-15 PROCEDURE — 36415 COLL VENOUS BLD VENIPUNCTURE: CPT | Performed by: INTERNAL MEDICINE

## 2021-03-15 PROCEDURE — 80053 COMPREHEN METABOLIC PANEL: CPT | Performed by: INTERNAL MEDICINE

## 2021-03-15 PROCEDURE — 80061 LIPID PANEL: CPT | Performed by: INTERNAL MEDICINE

## 2021-03-15 PROCEDURE — 87491 CHLMYD TRACH DNA AMP PROBE: CPT | Performed by: INTERNAL MEDICINE

## 2021-03-15 PROCEDURE — 86803 HEPATITIS C AB TEST: CPT | Performed by: INTERNAL MEDICINE

## 2021-03-15 PROCEDURE — 87389 HIV-1 AG W/HIV-1&-2 AB AG IA: CPT | Performed by: INTERNAL MEDICINE

## 2021-03-15 PROCEDURE — 87591 N.GONORRHOEAE DNA AMP PROB: CPT | Performed by: INTERNAL MEDICINE

## 2021-03-15 PROCEDURE — 99214 OFFICE O/P EST MOD 30 MIN: CPT | Performed by: INTERNAL MEDICINE

## 2021-03-15 PROCEDURE — 85018 HEMOGLOBIN: CPT | Performed by: INTERNAL MEDICINE

## 2021-03-15 RX ORDER — FAMOTIDINE 20 MG/1
20 TABLET, FILM COATED ORAL DAILY
Qty: 90 TABLET | Refills: 1 | Status: SHIPPED | OUTPATIENT
Start: 2021-03-15

## 2021-03-15 ASSESSMENT — MIFFLIN-ST. JEOR: SCORE: 1278.62

## 2021-03-15 NOTE — PROGRESS NOTES
Assessment & Plan     Abdominal fullness  Nonspecific complaints.  No focal changes on exam.  Otherwise healthy young female.  Suggest trial of Pepcid 20 mg daily.  Check routine labs.  Suggest abdominal ultrasound.  - Hemoglobin  - Comprehensive metabolic panel  - famotidine (PEPCID) 20 MG tablet; Take 1 tablet (20 mg) by mouth daily  - US Abdomen Complete; Future    Encounter for HCV screening test for low risk patient  Advise routine hepatitis C screening although low risk.    Screen for STD (sexually transmitted disease)  Offered routine STD screening.  - Chlamydia trachomatis PCR  - Neisseria gonorrhoeae PCR  - Hepatitis C antibody  - HIV Antigen Antibody Combo    CARDIOVASCULAR SCREENING; LDL GOAL LESS THAN 160  Labs ordered as fasting per routine.  - Comprehensive metabolic panel  - Lipid Profile    Patient was advised about the benefit obtaining a tetanus booster although she has just received your Covid shot yesterday and will be getting another one in a couple weeks thus we will hold on such.    Work on weight loss  Regular exercise  Patient was advised to follow-up with her primary gynecologist to obtain her Pap smear.    Return for if symptoms recur or worsen, diagnostic test as ordered, f/u OB/GYN for pap smear.    Kaden Gaviria MD  [facility]    Julissa Hsu is a 26 year old who presents for the following health issues     HPI     Patient not seen since 3/2017.    Patient states that she has had nonspecific complaints of vague abdominal fullness.  These symptoms are oftentimes worse after eating.  There is no distinct nausea or vomiting.  She reports a heartburn-like belchy bloating sensation that alleviate some of her symptoms.  She denies any dysphagia or difficulty with swallowing per se.    Abdominal/Flank Pain  Onset/Duration: 1 yr   Description:   Character: pressure in upper abd after eating   Location: epigastric region  Radiation: None  Intensity:  "moderate  Progression of Symptoms:  same  Accompanying Signs & Symptoms:  Fever/Chills: no  Gas/Bloating: no  Nausea: no  Vomitting: no  Diarrhea: no- occasional loose stools   Constipation: no  Dysuria or Hematuria: no  History:   Trauma: no  Previous similar pain: no  Previous tests done: none  Precipitating factors:   Does the pain change with:     Food: YES- occurs after every meal     Bowel Movement: YES    Urination: no   Other factors:  Patient questioning if she has lactose intolerance   Therapies tried and outcome: lactate pills- do not help     Patient's last menstrual period was 03/06/2021 (exact date).     LMP 3/6/21 on no OCP's using barrier method.  She is not interested in a pregnancy test.      Review of Systems   CONSTITUTIONAL: NEGATIVE for fever, chills, change in weight  EYES: NEGATIVE for vision changes or irritation  ENT/MOUTH: NEGATIVE for ear, mouth and throat problems  RESP: NEGATIVE for significant cough or SOB  CV: NEGATIVE for chest pain, palpitations or peripheral edema  : NEGATIVE for frequency, dysuria, or hematuria  MUSCULOSKELETAL: NEGATIVE for significant arthralgias or myalgia  NEURO: NEGATIVE for weakness, dizziness or paresthesias  HEME: NEGATIVE for bleeding problems  PSYCHIATRIC: NEGATIVE for changes in mood or affect      Objective    /62   Pulse 71   Temp 97.5  F (36.4  C) (Temporal)   Resp 14   Ht 1.664 m (5' 5.5\")   Wt 52.5 kg (115 lb 11.2 oz)   LMP 03/06/2021 (Exact Date)   SpO2 100%   Breastfeeding No   BMI 18.96 kg/m    Body mass index is 18.96 kg/m .  Physical Exam   GENERAL: healthy, alert and no distress  EYES: Eyes grossly normal to inspection, PERRL and conjunctivae and sclerae normal  HENT: ear canals and TM's normal, nose and mouth without ulcers or lesions  NECK: no adenopathy, no asymmetry, masses, or scars and thyroid normal to palpation  RESP: lungs clear to auscultation - no rales, rhonchi or wheezes  CV: regular rate and rhythm, normal S1 " S2, no S3 or S4, no click or rub, no peripheral edema and peripheral pulses strong  ABDOMEN: soft, nontender, no hepatosplenomegaly, no masses and bowel sounds normal  MS: no gross musculoskeletal defects noted  NEURO: No focal changes  PSYCH: mentation appears normal, affect normal/bright

## 2021-03-16 LAB
C TRACH DNA SPEC QL NAA+PROBE: NEGATIVE
N GONORRHOEA DNA SPEC QL NAA+PROBE: NEGATIVE
SPECIMEN SOURCE: NORMAL
SPECIMEN SOURCE: NORMAL

## 2021-03-22 ENCOUNTER — ANCILLARY PROCEDURE (OUTPATIENT)
Dept: ULTRASOUND IMAGING | Facility: CLINIC | Age: 26
End: 2021-03-22
Attending: INTERNAL MEDICINE
Payer: COMMERCIAL

## 2021-03-22 DIAGNOSIS — R19.8 ABDOMINAL FULLNESS: ICD-10-CM

## 2021-03-22 PROCEDURE — 76700 US EXAM ABDOM COMPLETE: CPT | Performed by: RADIOLOGY

## 2021-06-10 ENCOUNTER — TELEPHONE (OUTPATIENT)
Dept: OBGYN | Facility: CLINIC | Age: 26
End: 2021-06-10

## 2021-06-10 NOTE — TELEPHONE ENCOUNTER
Left Message for patient to call clinic back, she has appt with AB on Tuesday 06/15 for pap only. Would like to know if she is planning a full physical or has questions or concerns. If she does then appt can stay, but if it is truly just a pap only no other questions or concerns, wondering if she can go to [place] on Monday 06/14 at 830 am (the patient currently scheduled at that time needs a long visit d/t needing a procedure- so was hoping they could each switch and no one would have to reschedule completely.)     Harjinder Soria, CMA

## 2021-09-26 ENCOUNTER — HEALTH MAINTENANCE LETTER (OUTPATIENT)
Age: 26
End: 2021-09-26

## 2022-01-15 ENCOUNTER — HEALTH MAINTENANCE LETTER (OUTPATIENT)
Age: 27
End: 2022-01-15

## 2023-04-22 ENCOUNTER — HEALTH MAINTENANCE LETTER (OUTPATIENT)
Age: 28
End: 2023-04-22